# Patient Record
(demographics unavailable — no encounter records)

---

## 2025-02-28 NOTE — HISTORY OF PRESENT ILLNESS
[de-identified] : The patient is a 15 year old RT hand dominant male  who presents today for RT Shoulder pain. Date of Injury/Onset: 1/1/2025 Pain: At Rest: 3/10 With Activity: 8-9/10 Mechanism of injury: while wrestling in a match his RT arm was extended out behind him and he went to push off of it and felt pops  This is NOT a Work Related Injury being treated under Worker's Compensation. This is NOT an athletic injury occurring associated with an interscholastic or organized sports team. Quality of symptoms: sharp pain with motions. dull pain at rest, popping Improves with: rest, ice Worse with: abduction and external rotation Prior treatment: ATC at 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

## 2025-02-28 NOTE — IMAGING
[Right] : right shoulder [de-identified] : The patient is a well appearing 15 year  old male of their stated age. Neck is supple & nontender to palpation. Negative Spurling's test.   Effected Shoulder: RIGHT Inspection: Scapula Winging: Negative Deformity: None Erythema: None Ecchymosis: None Abrasions: None Effusion: None   Range of Motion: Active Forward Flexion: 180 degrees Active Abduction: 180 degrees Passive Forward Flexion: 180 degrees Passive Abduction: 180 degrees ER @ 90 degrees: 90 degrees IR @ 90 degrees: 45 degrees ER @ 0 degrees: 50 degrees   Motor Exam: Forward Flexion: 4 out of 5 Flexion Plane of Scapula: 4 out of 5 Abduction: 4 out of 5 Internal Rotation: 4 out of 5 External Rotation: 4 out of 5 Distal Motor Strength: 5 out of 5   Stability Testing: Anterior: 2+ Posterior: 1+ Sulcus N: 1+ Sulcus ER: 1+ Provocative Tests: Drop Arm: Negative Impingement: Negative [place]: Negative X-Arm Adduction: Negative Belly Press: Negative Bear Hug: Negative Lift Off: Negative Apprehension: POSITIVE  Relocation: POSITIVE  Posterior Load & Shift: Negative   Palpation: AC Joint: Nontender Clavicle: Nontender SC Joint: Nontender BicIpital Groove: Nontender Coracoid Process: Nontender Pectoralis Minor Tendon: Nontender Pectoralis Major Tendon: Nontender & palpably intact Latissimus Dorsi: Nontender Proximal Humerus: Nontender Scapula Body: Nontender Medial Scapula Boarder: Nontender Scapula Spine: Nontender   Neurologic Exam: Sensation to Light Touch: Axillary: Grossly intact Ulnar: Grossly intact Radial: Grossly intact Median: Grossly intact Other:  N/A Circulatory/Pulses: Ulnar: 2+ Radial: 2+ Other Pertinent Findings: None     Contralateral Shoulder: Range of Motion: Active Forward Flexion: 180 degrees Active Abduction: 180 degrees Passive Forward Flexion: 180 degrees Passive Abduction: 180 degrees ER @ 90 degrees: 90 degrees IR @ 90 degrees: 45 degrees ER @ 0 degrees: 50 degrees Motor Exam: Forward Flexion: 5 out of 5 Flexion Plane of Scapula: 5 out of 5 Abduction: 5 out of 5 Internal Rotation: 5 out of 5 External Rotation: 5 out of 5 Distal Motor Strength: 5 out of 5 Stability Testing: Anterior: 1+ Posterior: 1+ Sulcus N: 1+ Sulcus ER: 1+ Other Pertinent Findings: None   Assessment: The patient is a 15 year old male with Right shoulder pain and radiographic and physical exam findings consistent with possible labral tear The patient's condition is acute. Documents/Results Reviewed Today: X-Ray right shoulder/scapula Tests/Studies Independently Interpreted Today: X-Ray right shoulder/scapula is benign Pertinent findings include: 180/180/90/45/50, discomfort at ER, 4/5 throughout, +apprehension, + relocation, 2+ anterior Confounding medical conditions/concerns: None   Plan: Due to worsening pain and instability with mechanical symptoms, patient will obtain MR-Arthrogram right shoulder to evaluate for possible labral tear. In the interim, we reviewed appropriate use of OTC anti-inflammatories as needed for pain, inflammation, and discomfort. Modify activity as discussed. The patient is shut down from gym/sports until further notice- may do core and legs. He will start physical therapy, HEP and stretching.  Tests Ordered: MR-Arthrogram right shoulder Prescription Medications Ordered: Discussed appropriate use of OTC anti-inflammatories and analgesics (including but not limited to Aleve, Advil, Tylenol, Motrin, Ibuprofen, Voltaren gel, etc.) Braces/DME Ordered: None Activity/Work/Sports Status: shut down gym/sports until further notice- can do core and legs Additional Instructions: None Follow-Up: s/p MR-Arthrogram  The patient's current medication management of their orthopedic diagnosis was reviewed today: The patient declined and/or was contraindicated for the recommended prescription medication Naprosyn and will use over the counter Advil, Alleve, Voltaren Gel or Tylenol as directed.  (1) We discussed a comprehensive treatment plan that included possible pharmaceutical management involving the use of prescription strength medications versus over the counter oral medications and topical prescription vs over the counter medications.  Based on our extensive discussion, the patient declined prescription medication and will use over the counter Advil, Aleve, Voltaren Gel or Tylenol as directed. (2) There is a moderate risk of morbidity with further treatment, especially from use of prescription strength medications and possible side effects of these medications which include upset stomach with oral medications, skin reactions to topical medications and cardiac/renal issues with long term use. (3) I recommended that the patient follow-up with their medical physician to discuss any significant specific potential issues with long term medication use such as interactions with current medications or with exacerbation of underlying medical comorbidities. (4) The benefits and risks associated with use of injectable, oral or topical, prescription and over the counter anti-inflammatory medications were discussed with the patient. The patient voiced understanding of the risks including but not limited to bleeding, stroke, kidney dysfunction, heart disease, and were referred to the black box warning label for further information.  Makenzie SCALES attest that this documentation has been prepared under the direction and in the presence of Vdea Lezama PA-C.  The documentation recorded by the scribe accurately reflects the service Veda SCALES PA-C, personally performed and the decisions made by me. [FreeTextEntry1] : X-Ray right shoulder/scapula is benign

## 2025-03-27 NOTE — IMAGING
[de-identified] : The patient is a well appearing 15 year  old male of their stated age. Neck is supple & nontender to palpation. Negative Spurling's test.   Effected Shoulder: RIGHT Inspection: Scapula Winging: Negative Deformity: None Erythema: None Ecchymosis: None Abrasions: None Effusion: None   Range of Motion: Active Forward Flexion: 180 degrees Active Abduction: 180 degrees Passive Forward Flexion: 180 degrees Passive Abduction: 180 degrees ER @ 90 degrees: 90 degrees IR @ 90 degrees: 45 degrees ER @ 0 degrees: 50 degrees   Motor Exam: Forward Flexion: 4 out of 5 Flexion Plane of Scapula: 4 out of 5 Abduction: 4 out of 5 Internal Rotation: 4 out of 5 External Rotation: 4 out of 5 Distal Motor Strength: 5 out of 5   Stability Testing: Anterior: 3+ Posterior: 1+ Sulcus N: 1+ Sulcus ER: 1+ Provocative Tests: Drop Arm: Negative Impingement: Negative [place]: POSITIVE  X-Arm Adduction: Negative Belly Press: Negative Bear Hug: Negative Lift Off: Negative Apprehension: POSITIVE  Relocation: POSITIVE  Posterior Load & Shift: Negative   Palpation: AC Joint: Nontender Clavicle: Nontender SC Joint: Nontender BicIpital Groove: Nontender Coracoid Process: Nontender Pectoralis Minor Tendon: Nontender Pectoralis Major Tendon: Nontender & palpably intact Latissimus Dorsi: Nontender Proximal Humerus: Nontender Scapula Body: Nontender Medial Scapula Boarder: Nontender Scapula Spine: Nontender   Neurologic Exam: Sensation to Light Touch: Axillary: Grossly intact Ulnar: Grossly intact Radial: Grossly intact Median: Grossly intact Other:  N/A Circulatory/Pulses: Ulnar: 2+ Radial: 2+ Other Pertinent Findings: None   Contralateral Shoulder: Range of Motion: Active Forward Flexion: 180 degrees Active Abduction: 180 degrees Passive Forward Flexion: 180 degrees Passive Abduction: 180 degrees ER @ 90 degrees: 90 degrees IR @ 90 degrees: 45 degrees ER @ 0 degrees: 50 degrees Motor Exam: Forward Flexion: 5 out of 5 Flexion Plane of Scapula: 5 out of 5 Abduction: 5 out of 5 Internal Rotation: 5 out of 5 External Rotation: 5 out of 5 Distal Motor Strength: 5 out of 5 Stability Testing: Anterior: 1+ Posterior: 1+ Sulcus N: 1+ Sulcus ER: 1+ Other Pertinent Findings: None   Assessment: The patient is a 15 year old male with right shoulder pain/instability and radiographic and physical exam findings consistent with anterior bankart and SLAP.  The patient's condition is acute. Documents/Results Reviewed Today: MR-Arthrogram right shoulder Tests/Studies Independently Interpreted Today: MR-Arthrogram right shoulder reveals evidence of anterior bankart and SLAP tear, no significant hill-sachs deformity.  Pertinent findings include: 180/180/90/45/50, discomfort with ER, 4/5 throughout, +apprehension, +relocation, 3+ anterior, +[place]'s  Confounding medical conditions/concerns: None   Plan: Discussed operative vs non-operative treatment options including right shoulder arthroscopic anterior bankart and superior labral repair and any indicated procedures. Recommended the patient proceed with the suggested surgical procedure to improve the functionality of the affected joint. All questions and concerns regarding the surgery were addressed. Went over the recovery timeline and expected outcomes following surgery. The patient continues to be symptomatic and has failed conservative treatment, deciding to move forward with surgical intervention.   Tests Ordered: Pre-op Prescription Medications Ordered: Discussed appropriate use of OTC anti-inflammatories and analgesics (including but not limited to Aleve, Advil, Tylenol, Motrin, Ibuprofen, Voltaren gel, etc.) Braces/DME Ordered: UltraSling & cold therapy unit  Activity/Work/Sports Status: Shut down gym/sports until further notice- can do core and legs Additional Instructions: None Follow-Up: 2 weeks post-op  The patient's current medication management of their orthopedic diagnosis was reviewed today: The patient declined and/or was contraindicated for the recommended prescription medication Naprosyn and will use over the counter Advil, Alleve, Voltaren Gel or Tylenol as directed.  (1) We discussed a comprehensive treatment plan that included possible pharmaceutical management involving the use of prescription strength medications versus over the counter oral medications and topical prescription vs over the counter medications.  Based on our extensive discussion, the patient declined prescription medication and will use over the counter Advil, Aleve, Voltaren Gel or Tylenol as directed. (2) There is a moderate risk of morbidity with further treatment, especially from use of prescription strength medications and possible side effects of these medications which include upset stomach with oral medications, skin reactions to topical medications and cardiac/renal issues with long term use. (3) I recommended that the patient follow-up with their medical physician to discuss any significant specific potential issues with long term medication use such as interactions with current medications or with exacerbation of underlying medical comorbidities. (4) The benefits and risks associated with use of injectable, oral or topical, prescription and over the counter anti-inflammatory medications were discussed with the patient. The patient voiced understanding of the risks including but not limited to bleeding, stroke, kidney dysfunction, heart disease, and were referred to the black box warning label for further information.  Consent:  Conservative treatment, nontreatment, nonsurgical intervention and surgical intervention treatment options have been reviewed with the patient.  The patient continues to be symptomatic and has failed conservative treatment, and elects to move forward with surgical intervention.  The patient is indicated for right shoulder arthroscopic anterior bankart and superior labral repair and all indicated procedures. As such the alternatives, benefits and risks, of the above procedure, including but not limited to bleeding, infection, neurovascular injury, loss of limb, loss of life,  DVT, PE, RSD, inability to return to previous level of activity, inability to return to previous level of employment, advancement of or to osteoarthritic changes, joint instability or motion loss, hardware failure or migration, failure to resolve all symptoms, failure to return to sports and need for further procedures, as well as specific risk of need for future joint arthroplasty were discussed with the patient and/or their legal guardian who agreed to move forward with surgical intervention.  They have reviewed and signed the consent form today after expressing understanding of the above documented conversation. The patient or their representative will contact my office as instructed on the preoperative instruction sheet they received today to schedule surgery in a timely manner as discussed. Over 25 minutes were spent on this encounter including time with the patient and over 15 minutes spent on counseling and coordination of care.   Marli SCALES attest that this documentation has been prepared under the direction and in the presence of Provider Dr. Archie Cullen.   The documentation recorded by the scribe accurately reflects the services IDr. Archie, personally performed and the decisions made by me.

## 2025-03-27 NOTE — DATA REVIEWED
[MRI] : MRI [Right] : of the right [Shoulder] : shoulder [Report was reviewed and noted in the chart] : The report was reviewed and noted in the chart [I independently reviewed and interpreted images and report] : I independently reviewed and interpreted images and report [I reviewed the films/CD and additionally noted] : I reviewed the films/CD and additionally noted [FreeTextEntry1] : MR-Arthrogram right shoulder reveals evidence of anterior bankart and SLAP tear, no significant hill-sachs deformity.

## 2025-03-27 NOTE — IMAGING
[de-identified] : The patient is a well appearing 15 year  old male of their stated age. Neck is supple & nontender to palpation. Negative Spurling's test.   Effected Shoulder: RIGHT Inspection: Scapula Winging: Negative Deformity: None Erythema: None Ecchymosis: None Abrasions: None Effusion: None   Range of Motion: Active Forward Flexion: 180 degrees Active Abduction: 180 degrees Passive Forward Flexion: 180 degrees Passive Abduction: 180 degrees ER @ 90 degrees: 90 degrees IR @ 90 degrees: 45 degrees ER @ 0 degrees: 50 degrees   Motor Exam: Forward Flexion: 4 out of 5 Flexion Plane of Scapula: 4 out of 5 Abduction: 4 out of 5 Internal Rotation: 4 out of 5 External Rotation: 4 out of 5 Distal Motor Strength: 5 out of 5   Stability Testing: Anterior: 3+ Posterior: 1+ Sulcus N: 1+ Sulcus ER: 1+ Provocative Tests: Drop Arm: Negative Impingement: Negative [place]: POSITIVE  X-Arm Adduction: Negative Belly Press: Negative Bear Hug: Negative Lift Off: Negative Apprehension: POSITIVE  Relocation: POSITIVE  Posterior Load & Shift: Negative   Palpation: AC Joint: Nontender Clavicle: Nontender SC Joint: Nontender BicIpital Groove: Nontender Coracoid Process: Nontender Pectoralis Minor Tendon: Nontender Pectoralis Major Tendon: Nontender & palpably intact Latissimus Dorsi: Nontender Proximal Humerus: Nontender Scapula Body: Nontender Medial Scapula Boarder: Nontender Scapula Spine: Nontender   Neurologic Exam: Sensation to Light Touch: Axillary: Grossly intact Ulnar: Grossly intact Radial: Grossly intact Median: Grossly intact Other:  N/A Circulatory/Pulses: Ulnar: 2+ Radial: 2+ Other Pertinent Findings: None   Contralateral Shoulder: Range of Motion: Active Forward Flexion: 180 degrees Active Abduction: 180 degrees Passive Forward Flexion: 180 degrees Passive Abduction: 180 degrees ER @ 90 degrees: 90 degrees IR @ 90 degrees: 45 degrees ER @ 0 degrees: 50 degrees Motor Exam: Forward Flexion: 5 out of 5 Flexion Plane of Scapula: 5 out of 5 Abduction: 5 out of 5 Internal Rotation: 5 out of 5 External Rotation: 5 out of 5 Distal Motor Strength: 5 out of 5 Stability Testing: Anterior: 1+ Posterior: 1+ Sulcus N: 1+ Sulcus ER: 1+ Other Pertinent Findings: None   Assessment: The patient is a 15 year old male with right shoulder pain/instability and radiographic and physical exam findings consistent with anterior bankart and SLAP.  The patient's condition is acute. Documents/Results Reviewed Today: MR-Arthrogram right shoulder Tests/Studies Independently Interpreted Today: MR-Arthrogram right shoulder reveals evidence of anterior bankart and SLAP tear, no significant hill-sachs deformity.  Pertinent findings include: 180/180/90/45/50, discomfort with ER, 4/5 throughout, +apprehension, +relocation, 3+ anterior, +[place]'s  Confounding medical conditions/concerns: None   Plan: Discussed operative vs non-operative treatment options including right shoulder arthroscopic anterior bankart and superior labral repair and any indicated procedures. Recommended the patient proceed with the suggested surgical procedure to improve the functionality of the affected joint. All questions and concerns regarding the surgery were addressed. Went over the recovery timeline and expected outcomes following surgery. The patient continues to be symptomatic and has failed conservative treatment, deciding to move forward with surgical intervention.   Tests Ordered: Pre-op Prescription Medications Ordered: Discussed appropriate use of OTC anti-inflammatories and analgesics (including but not limited to Aleve, Advil, Tylenol, Motrin, Ibuprofen, Voltaren gel, etc.) Braces/DME Ordered: UltraSling & cold therapy unit  Activity/Work/Sports Status: Shut down gym/sports until further notice- can do core and legs Additional Instructions: None Follow-Up: 2 weeks post-op  The patient's current medication management of their orthopedic diagnosis was reviewed today: The patient declined and/or was contraindicated for the recommended prescription medication Naprosyn and will use over the counter Advil, Alleve, Voltaren Gel or Tylenol as directed.  (1) We discussed a comprehensive treatment plan that included possible pharmaceutical management involving the use of prescription strength medications versus over the counter oral medications and topical prescription vs over the counter medications.  Based on our extensive discussion, the patient declined prescription medication and will use over the counter Advil, Aleve, Voltaren Gel or Tylenol as directed. (2) There is a moderate risk of morbidity with further treatment, especially from use of prescription strength medications and possible side effects of these medications which include upset stomach with oral medications, skin reactions to topical medications and cardiac/renal issues with long term use. (3) I recommended that the patient follow-up with their medical physician to discuss any significant specific potential issues with long term medication use such as interactions with current medications or with exacerbation of underlying medical comorbidities. (4) The benefits and risks associated with use of injectable, oral or topical, prescription and over the counter anti-inflammatory medications were discussed with the patient. The patient voiced understanding of the risks including but not limited to bleeding, stroke, kidney dysfunction, heart disease, and were referred to the black box warning label for further information.  Consent:  Conservative treatment, nontreatment, nonsurgical intervention and surgical intervention treatment options have been reviewed with the patient.  The patient continues to be symptomatic and has failed conservative treatment, and elects to move forward with surgical intervention.  The patient is indicated for right shoulder arthroscopic anterior bankart and superior labral repair and all indicated procedures. As such the alternatives, benefits and risks, of the above procedure, including but not limited to bleeding, infection, neurovascular injury, loss of limb, loss of life,  DVT, PE, RSD, inability to return to previous level of activity, inability to return to previous level of employment, advancement of or to osteoarthritic changes, joint instability or motion loss, hardware failure or migration, failure to resolve all symptoms, failure to return to sports and need for further procedures, as well as specific risk of need for future joint arthroplasty were discussed with the patient and/or their legal guardian who agreed to move forward with surgical intervention.  They have reviewed and signed the consent form today after expressing understanding of the above documented conversation. The patient or their representative will contact my office as instructed on the preoperative instruction sheet they received today to schedule surgery in a timely manner as discussed. Over 25 minutes were spent on this encounter including time with the patient and over 15 minutes spent on counseling and coordination of care.   Marli SCALES attest that this documentation has been prepared under the direction and in the presence of Provider Dr. Archie Cullen.   The documentation recorded by the scribe accurately reflects the services IDr. Archie, personally performed and the decisions made by me.

## 2025-03-27 NOTE — HISTORY OF PRESENT ILLNESS
[de-identified] : The patient is a 15 year old RT hand dominant male  who presents today for RT Shoulder pain. Date of Injury/Onset: 1/1/2025 Pain: At Rest: 3/10 With Activity: 8-9/10 Mechanism of injury: while wrestling in a match his RT arm was extended out behind him and he went to push off of it and felt pops  This is NOT a Work Related Injury being treated under Worker's Compensation. This is NOT an athletic injury occurring associated with an interscholastic or organized sports team. Quality of symptoms: sharp pain with motions. 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 Feeling the same.  Additional Information: None

## 2025-03-27 NOTE — HISTORY OF PRESENT ILLNESS
[de-identified] : The patient is a 15 year old RT hand dominant male  who presents today for RT Shoulder pain. Date of Injury/Onset: 1/1/2025 Pain: At Rest: 3/10 With Activity: 8-9/10 Mechanism of injury: while wrestling in a match his RT arm was extended out behind him and he went to push off of it and felt pops  This is NOT a Work Related Injury being treated under Worker's Compensation. This is NOT an athletic injury occurring associated with an interscholastic or organized sports team. Quality of symptoms: sharp pain with motions. 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 Feeling the same.  Additional Information: None

## 2025-04-18 NOTE — HISTORY OF PRESENT ILLNESS
[de-identified] : The patient is s/p right shoulder anterior bankart repair, SLAP type 2 repair, RTC debridement and synovectomy Date of Surgery: 4/8/2025 Pain: At Rest: 0/10 With Activity: 4/10 Mechanism of injury:  while wrestling in a match his RT arm was extended out behind him and he went to push off of it and felt pops This is NOT a Work Related Injury being treated under Worker's Compensation. This IS an athletic injury occurring associated with an interscholastic or organized sports team. 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 She has been doing PT 3x/wk, working on PROM. C/o some difficulty sleeping, still in a recliner.  Additional Information: None

## 2025-04-18 NOTE — PHYSICAL EXAM
[de-identified] : The patient is a well appearing 15 year old male of their stated age. Negative Spurling bilateral. PATIENT PRESENTS IN ULTRASLING.   Surgical site: RIGHT shoulder   Incision sites: Well approximated, clean, dry, intact, without drainage, without erythema   Range of motion: 90/90 degrees   Motor Testing: Rotator cuff firing in all planes   Stability Testing: Limited by pain   Swelling/Effusion: None   Tenderness to palpation: None   Provocative testing: Limited by pain   Right Calf: soft and nontender Left Calf: soft and nontender   Neurovascular Examination: Grossly intact, 2+ distal pulses Contralateral Extremity: Examination grossly benign   Assessment & Plan: The patient is approximately 2 weeks s/p right shoulder anterior bankart repair, SLAP type 2 repair, RTC debridement and synovectomy (DOS:4/8/25). Sutures removed and Steri Strips applied today. The patient is instructed in wound management. The patient's post-op plan, protocol and activity modifications have been thoroughly discussed and the patient expressed understanding. Patient will continue use of sling as discussed, 2 more weeks with pillow. Continue physical therapy. The patient will control pain as discussed & continue ice and elevation as needed. The patient otherwise may advance activity as discussed. He is allowed to do stationary bike, walking, legs/core. Follow up 4 weeks.  The patient's current medication management of their orthopedic diagnosis was reviewed today:   (1) The patient will continue with the PRN use of their prescribed anti-inflammatory. (2) There is a moderate risk of morbidity with further treatment, especially from use of prescription strength medications and possible side effects of these medications which include upset stomach with oral medications, skin reactions to topical medications and cardiac/renal issues with long term use. (3) I recommended that the patient follow-up with their medical physician to discuss any significant specific potential issues with long term medication use such as interactions with current medications or with exacerbation of underlying medical comorbidities. (4) The benefits and risks associated with use of injectable, oral or topical, prescription and over the counter anti-inflammatory medications were discussed with the patient. The patient voiced understanding of the risks including but not limited to bleeding, stroke, kidney dysfunction, heart disease, and were referred to the black box warning label for further information.  I, Makenzie Angel attest that this documentation has been prepared under the direction and in the presence of Veda Lezama PA-C.  The documentation recorded by the scribe accurately reflects the service IVeda PA-C, personally performed and the decisions made by me.

## 2025-05-15 NOTE — HISTORY OF PRESENT ILLNESS
[de-identified] : The patient is s/p right shoulder anterior bankart repair, SLAP type 2 repair, RTC debridement and synovectomy Date of Surgery: 4/8/2025 Pain: At Rest: 0/10 With Activity: 0/10 Mechanism of injury:  while wrestling in a match his RT arm was extended out behind him and he went to push off of it and felt pops This is NOT a Work Related Injury being treated under Worker's Compensation. This IS an athletic injury occurring associated with an interscholastic or organized sports team. 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 Reports continued improvement with PT and has little to no discomfort with therex or ADL's. Additional Information: None

## 2025-05-15 NOTE — PHYSICAL EXAM
[de-identified] : The patient is a well appearing 15 year old male of their stated age. Negative Spurling bilateral.    Surgical site: RIGHT shoulder   Incision sites: Well Healed   Range of motion: 170/170/90/30/35 degrees   Motor Testin+/5 throughout   Stability Testing: Limited by pain   Swelling/Effusion: None   Tenderness to palpation: None   Provocative testing: Limited by pain   Right Calf: soft and nontender Left Calf: soft and nontender   Neurovascular Examination: Grossly intact, 2+ distal pulses Contralateral Extremity: Examination grossly benign   Assessment & Plan: The patient is approximately 6 weeks s/p right shoulder anterior bankart repair, SLAP type 2 repair, RTC debridement and synovectomy (DOS:25).  The patient is instructed in wound management. The patient's post-op plan, protocol and activity modifications have been thoroughly discussed and the patient expressed understanding.  The patient may gradually discontinue sling for activities of daily living. Recommended the patient wear as precautionary measure in large crowds, unstable environments, and inclement weather. Continue physical therapy as per protocol. The patient will control pain as discussed & continue ice and elevation as needed. The patient otherwise may advance activity as discussed. He is allowed to do stationary bike, walking, legs/core. Follow up 6 weeks.  The patient's current medication management of their orthopedic diagnosis was reviewed today:   (1) The patient will continue with the PRN use of their prescribed anti-inflammatory. (2) There is a moderate risk of morbidity with further treatment, especially from use of prescription strength medications and possible side effects of these medications which include upset stomach with oral medications, skin reactions to topical medications and cardiac/renal issues with long term use. (3) I recommended that the patient follow-up with their medical physician to discuss any significant specific potential issues with long term medication use such as interactions with current medications or with exacerbation of underlying medical comorbidities. (4) The benefits and risks associated with use of injectable, oral or topical, prescription and over the counter anti-inflammatory medications were discussed with the patient. The patient voiced understanding of the risks including but not limited to bleeding, stroke, kidney dysfunction, heart disease, and were referred to the black box warning label for further information.  I, Makenzie Angel attest that this documentation has been prepared under the direction and in the presence of Provider Dr. Archie Cullen.  The documentation recorded by the scribe accurately reflects the services I, Dr. Archie Cullen, personally performed and the decisions made by me.

## 2025-07-04 NOTE — HISTORY OF PRESENT ILLNESS
[de-identified] : The patient is s/p right shoulder anterior bankart repair, SLAP type 2 repair, RTC debridement and synovectomy Date of Surgery: 4/8/2025 Pain: At Rest: 0/10 With Activity: 0/10 Mechanism of injury:  while wrestling in a match his RT arm was extended out behind him and he went to push off of it and felt pops This is NOT a Work Related Injury being treated under Worker's Compensation. This IS an athletic injury occurring associated with an interscholastic or organized sports team. 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 He was advised by PT that he will likely be discharged next week. Patient is doing well w/ no c/o pain. Additional Information: None

## 2025-07-04 NOTE — PHYSICAL EXAM
[de-identified] : The patient is a well appearing 15 year old male of their stated age. Negative Spurling bilateral.  Surgical site: RIGHT shoulder   Incision sites: Well Healed   Range of motion: 180/180/90/40/35 degrees   Motor Testin+/5 throughout, flexion ABduction 5/5   Stability Testing: Limited by pain   Swelling/Effusion: None   Tenderness to palpation: None   Provocative testing: Limited by pain   Right Calf: soft and nontender Left Calf: soft and nontender   Neurovascular Examination: Grossly intact, 2+ distal pulses Contralateral Extremity: Examination grossly benign   Assessment & Plan: The patient is approximately 3 months s/p right shoulder anterior bankart repair, SLAP type 2 repair, RTC debridement and synovectomy (DOS:25). The patient's post-op plan, protocol and activity modifications have been thoroughly discussed and the patient expressed understanding. Continue physical therapy as per protocol. The patient will control pain as discussed & continue ice and elevation as needed. The patient otherwise may advance activity as discussed. He is allowed to do stationary bike, walking, legs/core. Follow up 4 weeks.  The patient's current medication management of their orthopedic diagnosis was reviewed today:   (1) The patient will continue with the PRN use of their prescribed anti-inflammatory. (2) There is a moderate risk of morbidity with further treatment, especially from use of prescription strength medications and possible side effects of these medications which include upset stomach with oral medications, skin reactions to topical medications and cardiac/renal issues with long term use. (3) I recommended that the patient follow-up with their medical physician to discuss any significant specific potential issues with long term medication use such as interactions with current medications or with exacerbation of underlying medical comorbidities. (4) The benefits and risks associated with use of injectable, oral or topical, prescription and over the counter anti-inflammatory medications were discussed with the patient. The patient voiced understanding of the risks including but not limited to bleeding, stroke, kidney dysfunction, heart disease, and were referred to the black box warning label for further information.  Aisha SCALES attest that this documentation has been prepared under the direction and in the presence of Provider Dr. Archie Cullen.  The documentation recorded by the scribe accurately reflects the services IDr. Archie, personally performed and the decisions made by me.

## 2025-07-04 NOTE — PHYSICAL EXAM
[de-identified] : The patient is a well appearing 15 year old male of their stated age. Negative Spurling bilateral.  Surgical site: RIGHT shoulder   Incision sites: Well Healed   Range of motion: 180/180/90/40/35 degrees   Motor Testin+/5 throughout, flexion ABduction 5/5   Stability Testing: Limited by pain   Swelling/Effusion: None   Tenderness to palpation: None   Provocative testing: Limited by pain   Right Calf: soft and nontender Left Calf: soft and nontender   Neurovascular Examination: Grossly intact, 2+ distal pulses Contralateral Extremity: Examination grossly benign   Assessment & Plan: The patient is approximately 3 months s/p right shoulder anterior bankart repair, SLAP type 2 repair, RTC debridement and synovectomy (DOS:25). The patient's post-op plan, protocol and activity modifications have been thoroughly discussed and the patient expressed understanding. Continue physical therapy as per protocol. The patient will control pain as discussed & continue ice and elevation as needed. The patient otherwise may advance activity as discussed. He is allowed to do stationary bike, walking, legs/core. Follow up 4 weeks.  The patient's current medication management of their orthopedic diagnosis was reviewed today:   (1) The patient will continue with the PRN use of their prescribed anti-inflammatory. (2) There is a moderate risk of morbidity with further treatment, especially from use of prescription strength medications and possible side effects of these medications which include upset stomach with oral medications, skin reactions to topical medications and cardiac/renal issues with long term use. (3) I recommended that the patient follow-up with their medical physician to discuss any significant specific potential issues with long term medication use such as interactions with current medications or with exacerbation of underlying medical comorbidities. (4) The benefits and risks associated with use of injectable, oral or topical, prescription and over the counter anti-inflammatory medications were discussed with the patient. The patient voiced understanding of the risks including but not limited to bleeding, stroke, kidney dysfunction, heart disease, and were referred to the black box warning label for further information.  Aisha SCALES attest that this documentation has been prepared under the direction and in the presence of Provider Dr. Archie Cullen.  The documentation recorded by the scribe accurately reflects the services IDr. Archie, personally performed and the decisions made by me.

## 2025-07-04 NOTE — HISTORY OF PRESENT ILLNESS
[de-identified] : The patient is s/p right shoulder anterior bankart repair, SLAP type 2 repair, RTC debridement and synovectomy Date of Surgery: 4/8/2025 Pain: At Rest: 0/10 With Activity: 0/10 Mechanism of injury:  while wrestling in a match his RT arm was extended out behind him and he went to push off of it and felt pops This is NOT a Work Related Injury being treated under Worker's Compensation. This IS an athletic injury occurring associated with an interscholastic or organized sports team. 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 He was advised by PT that he will likely be discharged next week. Patient is doing well w/ no c/o pain. Additional Information: None